# Patient Record
Sex: FEMALE | Race: WHITE | NOT HISPANIC OR LATINO | URBAN - NONMETROPOLITAN AREA
[De-identification: names, ages, dates, MRNs, and addresses within clinical notes are randomized per-mention and may not be internally consistent; named-entity substitution may affect disease eponyms.]

---

## 2023-04-17 ENCOUNTER — OFFICE VISIT (OUTPATIENT)
Dept: URBAN - NONMETROPOLITAN AREA CLINIC 2 | Facility: CLINIC | Age: 73
End: 2023-04-17

## 2023-04-18 ENCOUNTER — WEB ENCOUNTER (OUTPATIENT)
Dept: URBAN - NONMETROPOLITAN AREA CLINIC 2 | Facility: CLINIC | Age: 73
End: 2023-04-18

## 2023-04-24 ENCOUNTER — WEB ENCOUNTER (OUTPATIENT)
Dept: URBAN - NONMETROPOLITAN AREA CLINIC 2 | Facility: CLINIC | Age: 73
End: 2023-04-24

## 2023-04-24 ENCOUNTER — CLAIMS CREATED FROM THE CLAIM WINDOW (OUTPATIENT)
Dept: URBAN - NONMETROPOLITAN AREA CLINIC 2 | Facility: CLINIC | Age: 73
End: 2023-04-24
Payer: MEDICARE

## 2023-04-24 ENCOUNTER — LAB OUTSIDE AN ENCOUNTER (OUTPATIENT)
Dept: URBAN - NONMETROPOLITAN AREA CLINIC 2 | Facility: CLINIC | Age: 73
End: 2023-04-24

## 2023-04-24 VITALS
SYSTOLIC BLOOD PRESSURE: 118 MMHG | TEMPERATURE: 98.1 F | BODY MASS INDEX: 37.21 KG/M2 | HEIGHT: 63 IN | HEART RATE: 73 BPM | DIASTOLIC BLOOD PRESSURE: 76 MMHG | WEIGHT: 210 LBS

## 2023-04-24 DIAGNOSIS — K21.9 GASTROESOPHAGEAL REFLUX DISEASE, UNSPECIFIED WHETHER ESOPHAGITIS PRESENT: ICD-10-CM

## 2023-04-24 DIAGNOSIS — D50.9 IRON DEFICIENCY ANEMIA, UNSPECIFIED IRON DEFICIENCY ANEMIA TYPE: ICD-10-CM

## 2023-04-24 DIAGNOSIS — D50.8 ACQUIRED IRON DEFICIENCY ANEMIA DUE TO DECREASED ABSORPTION: ICD-10-CM

## 2023-04-24 DIAGNOSIS — K21.00 ALKALINE REFLUX ESOPHAGITIS: ICD-10-CM

## 2023-04-24 PROBLEM — 87522002: Status: ACTIVE | Noted: 2023-04-24

## 2023-04-24 PROBLEM — 235595009: Status: ACTIVE | Noted: 2023-04-24

## 2023-04-24 PROCEDURE — 99204 OFFICE O/P NEW MOD 45 MIN: CPT | Performed by: NURSE PRACTITIONER

## 2023-04-24 RX ORDER — HYDROCHLOROTHIAZIDE 12.5 MG/1
TABLET ORAL
Qty: 90 TABLET | Status: ACTIVE | COMMUNITY

## 2023-04-24 RX ORDER — CHLORHEXIDINE GLUCONATE 4 %
TAKE 1 TABLET (325 MG) BY ORAL ROUTE 2 TIMES PER DAY LIQUID (ML) TOPICAL 2
Qty: 0 | Refills: 0 | Status: ACTIVE | COMMUNITY
Start: 1900-01-01

## 2023-04-24 RX ORDER — LOSARTAN POTASSIUM 50 MG/1
TABLET, FILM COATED ORAL
Qty: 180 TABLET | Status: ACTIVE | COMMUNITY

## 2023-04-24 RX ORDER — OMEPRAZOLE 40 MG/1
1 TAB 30 MINUTES BEFORE MEAL CAPSULE, DELAYED RELEASE ORAL TWICE A DAY
Qty: 180 | Refills: 3 | OUTPATIENT
Start: 2023-04-24

## 2023-04-24 RX ORDER — LEVOTHYROXINE SODIUM 100 UG/1
TABLET ORAL
Qty: 90 TABLET | Status: ACTIVE | COMMUNITY

## 2023-04-24 RX ORDER — SIMVASTATIN 20 MG/1
TABLET, FILM COATED ORAL
Qty: 90 TABLET | Status: ACTIVE | COMMUNITY

## 2023-04-24 NOTE — PHYSICAL EXAM CONSTITUTIONAL:
well developed, well nourished , in no acute distress , ambulating without difficulty , normal communication ability  , well developed, well nourished , in no acute distress , ambulating without difficulty , normal communication ability

## 2023-04-24 NOTE — HPI-TODAY'S VISIT:
Margo is a 72 year old female who presents today for anemia. Labs with PCP on 3/29 showed iron 27, h/h 7.6/23.3. Since that time she has received two iron infusions and feels less fatigued. She denies any blood in stool or sputum. She would like to know if her blood counts have increased. Takes 2 iron tabs daily and a baby asa which she is weaning off (only takes 2x/week). Margo has a history of colon polyps, though last colonoscopy with Dr. Cain o=un 2018 was normal.  Ms. Johnson also has a history of reflux. She was previously on omeprazole but stopped this medication due to concerns of long term side effects. She started developing burning at night about 1.5 months ago and resumed omeprazole. Last EGD 2013. LG  OF NOTE - patient reports labs in chart from February showing Hgb 17 are an error. These have been removed from her hospital chart. LG

## 2023-04-24 NOTE — PHYSICAL EXAM CHEST:
normal respiratory pattern , normal respiratory pattern, breathing is unlabored without accessory muscle use.

## 2023-04-24 NOTE — PHYSICAL EXAM EYES:
Conjuntivae and eyelids appear normal,  Sclerae : White without injection  , Conjuntivae and eyelids appear normal,  Sclerae : White without injection

## 2023-04-25 LAB
ABSOLUTE BASOPHILS: 81
ABSOLUTE EOSINOPHILS: 81
ABSOLUTE LYMPHOCYTES: 1044
ABSOLUTE MONOCYTES: 441
ABSOLUTE NEUTROPHILS: 4153
BASOPHILS: 1.4
EOSINOPHILS: 1.4
HEMATOCRIT: 26
HEMOGLOBIN: 8.4
IRON BIND.CAP.(TIBC): 349
IRON SATURATION: 15
IRON: 53
LYMPHOCYTES: 18
MCH: 33.3
MCHC: 32.3
MCV: 103.2
MONOCYTES: 7.6
MPV: 11.2
NEUTROPHILS: 71.6
PLATELET COUNT: 276
RDW: 12.8
RED BLOOD CELL COUNT: 2.52
WHITE BLOOD CELL COUNT: 5.8

## 2023-05-08 ENCOUNTER — OFFICE VISIT (OUTPATIENT)
Dept: URBAN - METROPOLITAN AREA MEDICAL CENTER 1 | Facility: MEDICAL CENTER | Age: 73
End: 2023-05-08
Payer: MEDICARE

## 2023-05-08 ENCOUNTER — LAB OUTSIDE AN ENCOUNTER (OUTPATIENT)
Dept: URBAN - NONMETROPOLITAN AREA CLINIC 2 | Facility: CLINIC | Age: 73
End: 2023-05-08

## 2023-05-08 DIAGNOSIS — K31.89 OTHER DISEASES OF STOMACH AND DUODENUM: ICD-10-CM

## 2023-05-08 DIAGNOSIS — Z86.010 ADENOMAS PERSONAL HISTORY OF COLONIC POLYPS: ICD-10-CM

## 2023-05-08 DIAGNOSIS — D50.9 ANEMIA: ICD-10-CM

## 2023-05-08 DIAGNOSIS — K22.89 OTHER SPECIFIED DISEASE OF ESOPHAGUS: ICD-10-CM

## 2023-05-08 PROCEDURE — 45378 DIAGNOSTIC COLONOSCOPY: CPT | Performed by: INTERNAL MEDICINE

## 2023-05-08 PROCEDURE — 43239 EGD BIOPSY SINGLE/MULTIPLE: CPT | Performed by: INTERNAL MEDICINE

## 2023-05-08 RX ORDER — HYDROCHLOROTHIAZIDE 12.5 MG/1
TABLET ORAL
Qty: 90 TABLET | Status: ACTIVE | COMMUNITY

## 2023-05-08 RX ORDER — SIMVASTATIN 20 MG/1
TABLET, FILM COATED ORAL
Qty: 90 TABLET | Status: ACTIVE | COMMUNITY

## 2023-05-08 RX ORDER — CHLORHEXIDINE GLUCONATE 4 %
TAKE 1 TABLET (325 MG) BY ORAL ROUTE 2 TIMES PER DAY LIQUID (ML) TOPICAL 2
Qty: 0 | Refills: 0 | Status: ACTIVE | COMMUNITY
Start: 1900-01-01

## 2023-05-08 RX ORDER — OMEPRAZOLE 40 MG/1
1 TAB 30 MINUTES BEFORE MEAL CAPSULE, DELAYED RELEASE ORAL TWICE A DAY
Qty: 180 | Refills: 3 | Status: ACTIVE | COMMUNITY
Start: 2023-04-24

## 2023-05-08 RX ORDER — LEVOTHYROXINE SODIUM 100 UG/1
TABLET ORAL
Qty: 90 TABLET | Status: ACTIVE | COMMUNITY

## 2023-05-08 RX ORDER — LOSARTAN POTASSIUM 50 MG/1
TABLET, FILM COATED ORAL
Qty: 180 TABLET | Status: ACTIVE | COMMUNITY

## 2023-05-09 LAB
AP CASE REPORT: (no result)
AP FINAL DIAGNOSIS: (no result)
AP GROSS DESCRIPTION: (no result)
AP MICROSCOPIC DESCRIPTION: (no result)
AP SPECIAL STAINS: (no result)

## 2023-05-10 ENCOUNTER — TELEPHONE ENCOUNTER (OUTPATIENT)
Dept: URBAN - NONMETROPOLITAN AREA CLINIC 2 | Facility: CLINIC | Age: 73
End: 2023-05-10

## 2023-05-10 RX ORDER — PANTOPRAZOLE SODIUM 40 MG/1
1 TABLET TABLET, DELAYED RELEASE ORAL TWICE DAILY
Qty: 60 TABLET | Refills: 11 | OUTPATIENT
Start: 2023-05-10

## 2023-05-10 RX ORDER — SUCRALFATE 1 G/1
1 TABLET ON AN EMPTY STOMACH TABLET ORAL THREE TIMES A DAY
Qty: 90 TABLET | Refills: 11 | OUTPATIENT
Start: 2023-05-10 | End: 2024-05-04

## 2023-05-17 ENCOUNTER — TELEPHONE ENCOUNTER (OUTPATIENT)
Dept: URBAN - NONMETROPOLITAN AREA CLINIC 2 | Facility: CLINIC | Age: 73
End: 2023-05-17

## 2023-05-30 ENCOUNTER — TELEPHONE ENCOUNTER (OUTPATIENT)
Dept: URBAN - NONMETROPOLITAN AREA CLINIC 2 | Facility: CLINIC | Age: 73
End: 2023-05-30

## 2023-06-26 ENCOUNTER — OFFICE VISIT (OUTPATIENT)
Dept: URBAN - NONMETROPOLITAN AREA CLINIC 2 | Facility: CLINIC | Age: 73
End: 2023-06-26
Payer: MEDICARE

## 2023-06-26 ENCOUNTER — TELEPHONE ENCOUNTER (OUTPATIENT)
Dept: URBAN - NONMETROPOLITAN AREA CLINIC 2 | Facility: CLINIC | Age: 73
End: 2023-06-26

## 2023-06-26 ENCOUNTER — LAB OUTSIDE AN ENCOUNTER (OUTPATIENT)
Dept: URBAN - NONMETROPOLITAN AREA CLINIC 2 | Facility: CLINIC | Age: 73
End: 2023-06-26

## 2023-06-26 VITALS
SYSTOLIC BLOOD PRESSURE: 159 MMHG | DIASTOLIC BLOOD PRESSURE: 71 MMHG | HEIGHT: 63 IN | BODY MASS INDEX: 37.56 KG/M2 | WEIGHT: 212 LBS | HEART RATE: 88 BPM

## 2023-06-26 DIAGNOSIS — K20.0 EOSINOPHILIC ESOPHAGITIS: ICD-10-CM

## 2023-06-26 DIAGNOSIS — R10.32 LLQ DISCOMFORT: ICD-10-CM

## 2023-06-26 DIAGNOSIS — D64.89 OTHER SPECIFIED ANEMIAS: ICD-10-CM

## 2023-06-26 DIAGNOSIS — K31.819 GASTRIC ANTRAL VASCULAR ECTASIA: ICD-10-CM

## 2023-06-26 PROBLEM — 271737000: Status: ACTIVE | Noted: 2023-06-26

## 2023-06-26 PROBLEM — 43935004: Status: ACTIVE | Noted: 2023-06-26

## 2023-06-26 PROBLEM — 235599003: Status: ACTIVE | Noted: 2023-06-26

## 2023-06-26 PROCEDURE — 99214 OFFICE O/P EST MOD 30 MIN: CPT | Performed by: NURSE PRACTITIONER

## 2023-06-26 RX ORDER — LEVOTHYROXINE SODIUM 100 UG/1
TABLET ORAL
Qty: 90 TABLET | Status: ACTIVE | COMMUNITY

## 2023-06-26 RX ORDER — LOSARTAN POTASSIUM 50 MG/1
TABLET, FILM COATED ORAL
Qty: 180 TABLET | Status: ACTIVE | COMMUNITY

## 2023-06-26 RX ORDER — PANTOPRAZOLE SODIUM 40 MG/1
1 TABLET TABLET, DELAYED RELEASE ORAL TWICE DAILY
Qty: 60 TABLET | Refills: 11 | Status: ON HOLD | COMMUNITY
Start: 2023-05-10

## 2023-06-26 RX ORDER — SIMVASTATIN 20 MG/1
TABLET, FILM COATED ORAL
Qty: 90 TABLET | Status: ACTIVE | COMMUNITY

## 2023-06-26 RX ORDER — CHLORHEXIDINE GLUCONATE 4 %
TAKE 1 TABLET (325 MG) BY ORAL ROUTE 2 TIMES PER DAY LIQUID (ML) TOPICAL 2
Qty: 0 | Refills: 0 | Status: ACTIVE | COMMUNITY
Start: 1900-01-01

## 2023-06-26 RX ORDER — SUCRALFATE 1 G/1
1 TABLET ON AN EMPTY STOMACH TABLET ORAL THREE TIMES A DAY
Qty: 90 TABLET | Refills: 11 | Status: ACTIVE | COMMUNITY
Start: 2023-05-10 | End: 2024-05-04

## 2023-06-26 RX ORDER — HYDROCHLOROTHIAZIDE 12.5 MG/1
TABLET ORAL
Qty: 90 TABLET | Status: ACTIVE | COMMUNITY

## 2023-06-26 RX ORDER — OMEPRAZOLE 40 MG/1
1 TAB 30 MINUTES BEFORE MEAL CAPSULE, DELAYED RELEASE ORAL TWICE A DAY
Qty: 180 | Refills: 3 | Status: ACTIVE | COMMUNITY
Start: 2023-04-24

## 2023-06-26 NOTE — HPI-OTHER HISTORIES
4/24/2023- Margo is a 72 year old female who presents today for anemia. Labs with PCP on 3/29 showed iron 27, h/h 7.6/23.3. Since that time she has received two iron infusions and feels less fatigued. She denies any blood in stool or sputum. She would like to know if her blood counts have increased. Takes 2 iron tabs daily and a baby asa which she is weaning off (only takes 2x/week). Margo has a history of colon polyps, though last colonoscopy with Dr. Cain in 2018 was normal. Ms. Johnson also has a history of reflux. She was previously on omeprazole but stopped this medication due to concerns of long term side effects. She started developing burning at night about 1.5 months ago and resumed omeprazole. Last EGD 2013. LG  OF NOTE - patient reports labs in chart from February showing Hgb 17 are an error. These have been removed from her hospital chart. LG  5/2023 - egd/colon - EGD: 3 cm hiatal hernia, severe erythema with vascular appearance & enlarged appearing folds suspicious for GAVE w/o bleeding; recommended RUQ u/s to r/o cirrhosis, portal HTN, pantoprazole 40 mg BID, and carafate TID, with possible repeat EGD for APC treatment - colon: normal, repeat in 2033

## 2023-06-26 NOTE — HPI-TODAY'S VISIT:
Ms. Margo Johnson presents for follow-up after initially being seen in April for anemia.  She continues to take omeprazole twice daily and Carafate 3 times daily as instructed by Dr. Sandoval following her EGD.  She reports that the burning pain she was experiencing at night is better.  She also denies any difficulty swallowing or pain with swallowing.  Unfortunately, she was rehospitalized at the end of May for hemoglobin of 6.9 requiring blood transfusion.  A bone marrow biopsy was done at that time showing no evidence of lymphoma or leukemia's.  A right upper quadrant ultrasound was also done that reportedly showed a normal liver but evidence of gallstones.  She follows with Dr. Lawson, her hematologist, and is scheduled to see her tomorrow.  She is also seeing her primary care provider next week due to worsening edema in her legs and feet.  She has noticed just a little bit of discomfort in her left lower quadrant but is having normal bowel movements, soft but formed, once per day. LG.

## 2023-06-27 ENCOUNTER — TELEPHONE ENCOUNTER (OUTPATIENT)
Dept: URBAN - NONMETROPOLITAN AREA CLINIC 2 | Facility: CLINIC | Age: 73
End: 2023-06-27

## 2023-07-10 ENCOUNTER — OFFICE VISIT (OUTPATIENT)
Dept: URBAN - METROPOLITAN AREA MEDICAL CENTER 1 | Facility: MEDICAL CENTER | Age: 73
End: 2023-07-10
Payer: MEDICARE

## 2023-07-10 DIAGNOSIS — K31.7 BENIGN GASTRIC POLYP: ICD-10-CM

## 2023-07-10 DIAGNOSIS — K31.819 ACQUIRED ARTERIOVENOUS MALFORMATION OF DUODENUM: ICD-10-CM

## 2023-07-10 PROCEDURE — 43270 EGD LESION ABLATION: CPT | Performed by: INTERNAL MEDICINE

## 2023-07-10 RX ORDER — CHLORHEXIDINE GLUCONATE 4 %
TAKE 1 TABLET (325 MG) BY ORAL ROUTE 2 TIMES PER DAY LIQUID (ML) TOPICAL 2
Qty: 0 | Refills: 0 | Status: ACTIVE | COMMUNITY
Start: 1900-01-01

## 2023-07-10 RX ORDER — PANTOPRAZOLE SODIUM 40 MG/1
1 TABLET TABLET, DELAYED RELEASE ORAL TWICE DAILY
Qty: 60 TABLET | Refills: 11 | Status: ON HOLD | COMMUNITY
Start: 2023-05-10

## 2023-07-10 RX ORDER — SUCRALFATE 1 G/1
1 TABLET ON AN EMPTY STOMACH TABLET ORAL THREE TIMES A DAY
Qty: 90 TABLET | Refills: 11 | Status: ACTIVE | COMMUNITY
Start: 2023-05-10 | End: 2024-05-04

## 2023-07-10 RX ORDER — HYDROCHLOROTHIAZIDE 12.5 MG/1
TABLET ORAL
Qty: 90 TABLET | Status: ACTIVE | COMMUNITY

## 2023-07-10 RX ORDER — OMEPRAZOLE 40 MG/1
1 TAB 30 MINUTES BEFORE MEAL CAPSULE, DELAYED RELEASE ORAL TWICE A DAY
Qty: 180 | Refills: 3 | Status: ACTIVE | COMMUNITY
Start: 2023-04-24

## 2023-07-10 RX ORDER — LEVOTHYROXINE SODIUM 100 UG/1
TABLET ORAL
Qty: 90 TABLET | Status: ACTIVE | COMMUNITY

## 2023-07-10 RX ORDER — LOSARTAN POTASSIUM 50 MG/1
TABLET, FILM COATED ORAL
Qty: 180 TABLET | Status: ACTIVE | COMMUNITY

## 2023-07-10 RX ORDER — SIMVASTATIN 20 MG/1
TABLET, FILM COATED ORAL
Qty: 90 TABLET | Status: ACTIVE | COMMUNITY

## 2023-07-17 ENCOUNTER — TELEPHONE ENCOUNTER (OUTPATIENT)
Dept: URBAN - NONMETROPOLITAN AREA CLINIC 2 | Facility: CLINIC | Age: 73
End: 2023-07-17

## 2023-07-17 RX ORDER — SUCRALFATE 1 G/1
1 TABLET ON AN EMPTY STOMACH TABLET ORAL THREE TIMES A DAY
Qty: 90 TABLET | Refills: 11
Start: 2023-05-10 | End: 2024-07-11

## 2023-07-17 RX ORDER — OMEPRAZOLE 40 MG/1
1 TAB 30 MINUTES BEFORE MEAL CAPSULE, DELAYED RELEASE ORAL TWICE A DAY
Qty: 180 | Refills: 3
Start: 2023-04-24

## 2023-07-31 ENCOUNTER — OFFICE VISIT (OUTPATIENT)
Dept: URBAN - METROPOLITAN AREA MEDICAL CENTER 1 | Facility: MEDICAL CENTER | Age: 73
End: 2023-07-31

## 2023-08-18 ENCOUNTER — OFFICE VISIT (OUTPATIENT)
Dept: URBAN - NONMETROPOLITAN AREA CLINIC 2 | Facility: CLINIC | Age: 73
End: 2023-08-18
Payer: MEDICARE

## 2023-08-18 ENCOUNTER — DASHBOARD ENCOUNTERS (OUTPATIENT)
Age: 73
End: 2023-08-18

## 2023-08-18 ENCOUNTER — LAB OUTSIDE AN ENCOUNTER (OUTPATIENT)
Dept: URBAN - NONMETROPOLITAN AREA CLINIC 2 | Facility: CLINIC | Age: 73
End: 2023-08-18

## 2023-08-18 VITALS
TEMPERATURE: 97.5 F | HEIGHT: 63 IN | HEART RATE: 76 BPM | WEIGHT: 211 LBS | BODY MASS INDEX: 37.39 KG/M2 | SYSTOLIC BLOOD PRESSURE: 114 MMHG | DIASTOLIC BLOOD PRESSURE: 64 MMHG

## 2023-08-18 DIAGNOSIS — R10.32 LLQ DISCOMFORT: ICD-10-CM

## 2023-08-18 DIAGNOSIS — K20.0 EOSINOPHILIC ESOPHAGITIS: ICD-10-CM

## 2023-08-18 DIAGNOSIS — K92.2 GASTROINTESTINAL HEMORRHAGE, UNSPECIFIED GASTROINTESTINAL HEMORRHAGE TYPE: ICD-10-CM

## 2023-08-18 DIAGNOSIS — D64.89 ANEMIA DUE TO OTHER CAUSE: ICD-10-CM

## 2023-08-18 DIAGNOSIS — D50.0 IRON DEFICIENCY ANEMIA DUE TO CHRONIC BLOOD LOSS: ICD-10-CM

## 2023-08-18 DIAGNOSIS — K31.819 GASTRIC ANTRAL VASCULAR ECTASIA: ICD-10-CM

## 2023-08-18 PROBLEM — 724556004: Status: ACTIVE | Noted: 2023-08-18

## 2023-08-18 PROBLEM — 301716002: Status: ACTIVE | Noted: 2023-06-26

## 2023-08-18 PROCEDURE — 99214 OFFICE O/P EST MOD 30 MIN: CPT | Performed by: NURSE PRACTITIONER

## 2023-08-18 RX ORDER — SIMVASTATIN 20 MG/1
TABLET, FILM COATED ORAL
Qty: 90 TABLET | Status: ACTIVE | COMMUNITY

## 2023-08-18 RX ORDER — LEVOTHYROXINE SODIUM 100 UG/1
TABLET ORAL
Qty: 90 TABLET | Status: ACTIVE | COMMUNITY

## 2023-08-18 RX ORDER — HYDROCHLOROTHIAZIDE 12.5 MG/1
TABLET ORAL
Qty: 90 TABLET | Status: ACTIVE | COMMUNITY

## 2023-08-18 RX ORDER — SUCRALFATE 1 G/1
1 TABLET ON AN EMPTY STOMACH TABLET ORAL THREE TIMES A DAY
Qty: 90 TABLET | Refills: 11 | Status: ACTIVE | COMMUNITY
Start: 2023-05-10 | End: 2024-07-11

## 2023-08-18 RX ORDER — OMEPRAZOLE 40 MG/1
1 TAB 30 MINUTES BEFORE MEAL CAPSULE, DELAYED RELEASE ORAL TWICE A DAY
Qty: 180 | Refills: 3 | Status: ACTIVE | COMMUNITY
Start: 2023-04-24

## 2023-08-18 RX ORDER — PANTOPRAZOLE SODIUM 40 MG/1
1 TABLET TABLET, DELAYED RELEASE ORAL TWICE DAILY
Qty: 60 TABLET | Refills: 11 | Status: ON HOLD | COMMUNITY
Start: 2023-05-10

## 2023-08-18 RX ORDER — CHLORHEXIDINE GLUCONATE 4 %
TAKE 1 TABLET (325 MG) BY ORAL ROUTE 2 TIMES PER DAY LIQUID (ML) TOPICAL 2
Qty: 0 | Refills: 0 | Status: ACTIVE | COMMUNITY
Start: 1900-01-01

## 2023-08-18 RX ORDER — LOSARTAN POTASSIUM 50 MG/1
TABLET, FILM COATED ORAL
Qty: 180 TABLET | Status: ACTIVE | COMMUNITY

## 2023-08-18 NOTE — HPI-TODAY'S VISIT:
Ms. Margo Johnson presents for follow-up MORRIS/GAVE. recently saw heme earlier this week. denies bleeding. hgb improved to 8.1 and has stayed there.  .  A bone marrow biopsy was done at that time showing no evidence of lymphoma or leukemia's.  A right upper quadrant ultrasound was also done that reportedly showed a normal liver but evidence of gallstones.  She follows with Dr. Lawson, her hematologist. last colon 5/23 negative. 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 She is here to schedule VCE and repeat egd in 3 months. sb

## 2023-09-05 ENCOUNTER — TELEPHONE ENCOUNTER (OUTPATIENT)
Dept: URBAN - NONMETROPOLITAN AREA CLINIC 2 | Facility: CLINIC | Age: 73
End: 2023-09-05

## 2023-09-05 ENCOUNTER — OFFICE VISIT (OUTPATIENT)
Dept: URBAN - NONMETROPOLITAN AREA CLINIC 1 | Facility: CLINIC | Age: 73
End: 2023-09-05
Payer: MEDICARE

## 2023-09-05 DIAGNOSIS — D50.9 IDA (IRON DEFICIENCY ANEMIA): ICD-10-CM

## 2023-09-05 PROCEDURE — 91110 GI TRC IMG INTRAL ESOPH-ILE: CPT | Performed by: INTERNAL MEDICINE

## 2023-09-25 ENCOUNTER — OFFICE VISIT (OUTPATIENT)
Dept: URBAN - METROPOLITAN AREA MEDICAL CENTER 1 | Facility: MEDICAL CENTER | Age: 73
End: 2023-09-25
Payer: MEDICARE

## 2023-09-25 DIAGNOSIS — K31.7 BENIGN GASTRIC POLYP: ICD-10-CM

## 2023-09-25 DIAGNOSIS — K31.819 ACQUIRED ARTERIOVENOUS MALFORMATION OF DUODENUM: ICD-10-CM

## 2023-09-25 PROCEDURE — 43270 EGD LESION ABLATION: CPT | Performed by: INTERNAL MEDICINE

## 2023-09-25 RX ORDER — OMEPRAZOLE 40 MG/1
1 TAB 30 MINUTES BEFORE MEAL CAPSULE, DELAYED RELEASE ORAL TWICE A DAY
Qty: 180 | Refills: 3 | Status: ACTIVE | COMMUNITY
Start: 2023-04-24

## 2023-09-25 RX ORDER — LOSARTAN POTASSIUM 50 MG/1
TABLET, FILM COATED ORAL
Qty: 180 TABLET | Status: ACTIVE | COMMUNITY

## 2023-09-25 RX ORDER — PANTOPRAZOLE SODIUM 40 MG/1
1 TABLET TABLET, DELAYED RELEASE ORAL TWICE DAILY
Qty: 60 TABLET | Refills: 11 | Status: ON HOLD | COMMUNITY
Start: 2023-05-10

## 2023-09-25 RX ORDER — SUCRALFATE 1 G/1
1 TABLET ON AN EMPTY STOMACH TABLET ORAL THREE TIMES A DAY
Qty: 90 TABLET | Refills: 11 | Status: ACTIVE | COMMUNITY
Start: 2023-05-10 | End: 2024-07-11

## 2023-09-25 RX ORDER — LEVOTHYROXINE SODIUM 100 UG/1
TABLET ORAL
Qty: 90 TABLET | Status: ACTIVE | COMMUNITY

## 2023-09-25 RX ORDER — SIMVASTATIN 20 MG/1
TABLET, FILM COATED ORAL
Qty: 90 TABLET | Status: ACTIVE | COMMUNITY

## 2023-09-25 RX ORDER — CHLORHEXIDINE GLUCONATE 4 %
TAKE 1 TABLET (325 MG) BY ORAL ROUTE 2 TIMES PER DAY LIQUID (ML) TOPICAL 2
Qty: 0 | Refills: 0 | Status: ACTIVE | COMMUNITY
Start: 1900-01-01

## 2023-09-25 RX ORDER — HYDROCHLOROTHIAZIDE 12.5 MG/1
TABLET ORAL
Qty: 90 TABLET | Status: ACTIVE | COMMUNITY

## 2023-09-26 ENCOUNTER — OFFICE VISIT (OUTPATIENT)
Dept: URBAN - NONMETROPOLITAN AREA CLINIC 2 | Facility: CLINIC | Age: 73
End: 2023-09-26

## 2023-10-04 ENCOUNTER — OFFICE VISIT (OUTPATIENT)
Dept: URBAN - NONMETROPOLITAN AREA CLINIC 2 | Facility: CLINIC | Age: 73
End: 2023-10-04

## 2023-10-19 ENCOUNTER — TELEPHONE ENCOUNTER (OUTPATIENT)
Dept: URBAN - NONMETROPOLITAN AREA CLINIC 2 | Facility: CLINIC | Age: 73
End: 2023-10-19

## 2023-10-23 ENCOUNTER — LAB OUTSIDE AN ENCOUNTER (OUTPATIENT)
Dept: URBAN - NONMETROPOLITAN AREA CLINIC 2 | Facility: CLINIC | Age: 73
End: 2023-10-23

## 2023-10-25 ENCOUNTER — OFFICE VISIT (OUTPATIENT)
Dept: URBAN - NONMETROPOLITAN AREA CLINIC 2 | Facility: CLINIC | Age: 73
End: 2023-10-25

## 2023-11-13 ENCOUNTER — CLAIMS CREATED FROM THE CLAIM WINDOW (OUTPATIENT)
Dept: URBAN - METROPOLITAN AREA MEDICAL CENTER 1 | Facility: MEDICAL CENTER | Age: 73
End: 2023-11-13
Payer: MEDICARE

## 2023-11-13 DIAGNOSIS — D53.8 OTHER SPECIFIED NUTRITIONAL ANEMIAS: ICD-10-CM

## 2023-11-13 DIAGNOSIS — K92.1 ACUTE MELENA: ICD-10-CM

## 2023-11-13 DIAGNOSIS — K31.819 ACQUIRED ARTERIOVENOUS MALFORMATION OF DUODENUM: ICD-10-CM

## 2023-11-13 DIAGNOSIS — K20.0 ALLERGIC EOSINOPHILIC ESOPHAGITIS: ICD-10-CM

## 2023-11-13 PROCEDURE — G8427 DOCREV CUR MEDS BY ELIG CLIN: HCPCS | Performed by: REGISTERED NURSE

## 2023-11-13 PROCEDURE — 99223 1ST HOSP IP/OBS HIGH 75: CPT | Performed by: REGISTERED NURSE

## 2023-11-13 PROCEDURE — 99255 IP/OBS CONSLTJ NEW/EST HI 80: CPT | Performed by: REGISTERED NURSE

## 2023-11-14 ENCOUNTER — CLAIMS CREATED FROM THE CLAIM WINDOW (OUTPATIENT)
Dept: URBAN - METROPOLITAN AREA MEDICAL CENTER 1 | Facility: MEDICAL CENTER | Age: 73
End: 2023-11-14
Payer: MEDICARE

## 2023-11-14 DIAGNOSIS — K31.819 ACQUIRED ARTERIOVENOUS MALFORMATION OF DUODENUM: ICD-10-CM

## 2023-11-14 DIAGNOSIS — K92.1 ACUTE MELENA: ICD-10-CM

## 2023-11-14 PROCEDURE — 43235 EGD DIAGNOSTIC BRUSH WASH: CPT | Performed by: INTERNAL MEDICINE

## 2023-11-14 PROCEDURE — 43270 EGD LESION ABLATION: CPT | Performed by: INTERNAL MEDICINE

## 2023-12-01 ENCOUNTER — TELEPHONE ENCOUNTER (OUTPATIENT)
Dept: URBAN - NONMETROPOLITAN AREA CLINIC 2 | Facility: CLINIC | Age: 73
End: 2023-12-01

## 2023-12-06 ENCOUNTER — OFFICE VISIT (OUTPATIENT)
Dept: URBAN - NONMETROPOLITAN AREA CLINIC 2 | Facility: CLINIC | Age: 73
End: 2023-12-06

## 2024-01-03 ENCOUNTER — TELEPHONE ENCOUNTER (OUTPATIENT)
Dept: URBAN - NONMETROPOLITAN AREA CLINIC 2 | Facility: CLINIC | Age: 74
End: 2024-01-03

## 2024-02-12 ENCOUNTER — EGD (OUTPATIENT)
Dept: URBAN - METROPOLITAN AREA MEDICAL CENTER 1 | Facility: MEDICAL CENTER | Age: 74
End: 2024-02-12

## 2024-05-29 ENCOUNTER — TELEPHONE ENCOUNTER (OUTPATIENT)
Dept: URBAN - NONMETROPOLITAN AREA CLINIC 2 | Facility: CLINIC | Age: 74
End: 2024-05-29

## 2024-08-26 ENCOUNTER — ERX REFILL RESPONSE (OUTPATIENT)
Dept: URBAN - NONMETROPOLITAN AREA CLINIC 2 | Facility: CLINIC | Age: 74
End: 2024-08-26

## 2024-08-26 RX ORDER — OMEPRAZOLE 40 MG/1
TAKE 1 CAPSULE TWICE DAILY 30 MINUTES BEFORE MEALS CAPSULE, DELAYED RELEASE ORAL
Qty: 180 CAPSULE | Refills: 3 | OUTPATIENT

## 2024-08-26 RX ORDER — OMEPRAZOLE 40 MG/1
1 TAB 30 MINUTES BEFORE MEAL CAPSULE, DELAYED RELEASE ORAL TWICE A DAY
Qty: 180 | Refills: 3 | OUTPATIENT